# Patient Record
Sex: FEMALE | Race: WHITE | NOT HISPANIC OR LATINO | Employment: FULL TIME | URBAN - METROPOLITAN AREA
[De-identification: names, ages, dates, MRNs, and addresses within clinical notes are randomized per-mention and may not be internally consistent; named-entity substitution may affect disease eponyms.]

---

## 2022-03-22 ENCOUNTER — TRANSCRIBE ORDERS (OUTPATIENT)
Dept: PHYSICAL THERAPY | Facility: CLINIC | Age: 62
End: 2022-03-22

## 2022-03-22 DIAGNOSIS — M79.652 LEFT THIGH PAIN: ICD-10-CM

## 2022-03-22 DIAGNOSIS — M25.562 LEFT KNEE PAIN, UNSPECIFIED CHRONICITY: Primary | ICD-10-CM

## 2022-03-28 ENCOUNTER — TREATMENT (OUTPATIENT)
Dept: PHYSICAL THERAPY | Facility: CLINIC | Age: 62
End: 2022-03-28

## 2022-03-28 DIAGNOSIS — M25.562 ACUTE PAIN OF LEFT KNEE: Primary | ICD-10-CM

## 2022-03-28 PROCEDURE — 97112 NEUROMUSCULAR REEDUCATION: CPT | Performed by: PHYSICAL THERAPIST

## 2022-03-28 PROCEDURE — 97110 THERAPEUTIC EXERCISES: CPT | Performed by: PHYSICAL THERAPIST

## 2022-03-28 PROCEDURE — 97161 PT EVAL LOW COMPLEX 20 MIN: CPT | Performed by: PHYSICAL THERAPIST

## 2022-03-28 NOTE — PATIENT INSTRUCTIONS
Access Code: [email or web address]  URL: [email or web address]  Date: 03/28/2022  Prepared by: Deanne Sanchez    Exercises  Long Sitting Quad Set - 2-3 x daily - 15-30 reps  Supine Knee Extension Strengthening - 2-3 x daily - 15-30 reps  Small Range Straight Leg Raise - 2-3 x daily - 15-30 reps  Supine Heel Slide - 2-3 x daily - 15-30 reps  Standing Single Leg Stance with Counter Support - 2-3 x daily - 3 reps - 15-30 sec hold

## 2022-03-30 ENCOUNTER — TREATMENT (OUTPATIENT)
Dept: PHYSICAL THERAPY | Facility: CLINIC | Age: 62
End: 2022-03-30

## 2022-03-30 DIAGNOSIS — M25.562 ACUTE PAIN OF LEFT KNEE: Primary | ICD-10-CM

## 2022-03-30 PROCEDURE — 97530 THERAPEUTIC ACTIVITIES: CPT | Performed by: PHYSICAL THERAPIST

## 2022-03-30 PROCEDURE — 97112 NEUROMUSCULAR REEDUCATION: CPT | Performed by: PHYSICAL THERAPIST

## 2022-03-30 PROCEDURE — 97110 THERAPEUTIC EXERCISES: CPT | Performed by: PHYSICAL THERAPIST

## 2022-03-30 NOTE — PROGRESS NOTES
"Physical Therapy Daily Treatment Note      Patient: Trina Garcia   : 1960  Referring practitioner: Fabien Valentin MD  Date of Initial Visit: Type: THERAPY  Noted: 3/28/2022  Today's Date: 3/30/2022  Patient seen for 2 sessions       Visit Diagnoses:    ICD-10-CM ICD-9-CM   1. Acute pain of left knee  M25.562 719.46       Subjective   Trina Garcia reports: \"I'm doing much better. I think I was protecting it too much and not allowing it to move. I feel looser. I don't need the cane unless I'm out walking in the field. My knee is still locking some but it's less frequent.\"    Pre tx pn score: 0  Post tx pn score: 0      Objective   See Exercise, Manual, and Modality Logs for complete treatment.     Infrapatellar pn at end range knee flxn        Assessment & Plan     Assessment    Assessment details: Pt subjectively reports improvement in knee w/ HEP. Demonstrates improved knee flxn ROM, though cont to elicit infrapatellar pn at end range. She experienced locking episode when attempting to flex knee from extended position after performing calf raises-alleviated by further flexing knee. Stability w/ SLS improved, able to tolerate on airex well. No changes made to HEP.    Plan  Plan details: Progress per POC          Timed:         Manual Therapy:    0     mins  51447;     Therapeutic Exercise:    15     mins  09091;     Neuromuscular Liset:    8    mins  22032;    Therapeutic Activity:     9     mins  34623;     Gait Trainin     mins  69589;     Ultrasound:     0     mins  59260;    Ionto                               0    mins   13576  Self Care                       0     mins   43690  Canalith Repos    0     mins 39981      Un-Timed:  Electrical Stimulation:    0     mins  28408 ( );  Dry Needling     0     mins self-pay  Traction     0     mins 18931      Timed Treatment:   32   mins   Total Treatment:     32   mins    Deanne Sanchez, PT  [place] License: #[id number]                "

## 2022-04-01 ENCOUNTER — TREATMENT (OUTPATIENT)
Dept: PHYSICAL THERAPY | Facility: CLINIC | Age: 62
End: 2022-04-01

## 2022-04-01 DIAGNOSIS — M25.562 ACUTE PAIN OF LEFT KNEE: Primary | ICD-10-CM

## 2022-04-01 PROCEDURE — 97110 THERAPEUTIC EXERCISES: CPT | Performed by: PHYSICAL THERAPIST

## 2022-04-01 PROCEDURE — 97112 NEUROMUSCULAR REEDUCATION: CPT | Performed by: PHYSICAL THERAPIST

## 2022-04-01 NOTE — PROGRESS NOTES
Physical Therapy Daily Treatment Note      Patient: Trina Garcia   : 1960  Referring practitioner: Fabien Valentin MD  Date of Initial Visit: Type: THERAPY  Noted: 3/28/2022  Today's Date: 2022  Patient seen for 3 sessions       Visit Diagnoses:    ICD-10-CM ICD-9-CM   1. Acute pain of left knee  M25.562 719.46       Subjective   Trina Garcia reports: Misplaced her exercises so has had to work from memory. Hasn't used her cane the past couple of days. Still experiences locking if she stands w/ knee extended and then goes to bend it.    Pre tx pn score: 0  Post tx pn score: 0      Objective   See Exercise, Manual, and Modality Logs for complete treatment.     (-) popping/clicking w/ heel slides        Assessment & Plan     Assessment    Assessment details: Pt doing well in regards to pn. Less dependent on AD. Demo improved tolerance to active knee mobility. Cont to report locking sensation in knee when initiating flxn from fully extended position, primarily in standing. Emphasized quad control progressing to standing TKE w/ TB. Issued updated HEP, provided TB for home use. Pt out of the country for approx 1 month.     Plan  Plan details: Reassess upon return to PT after travel          Timed:         Manual Therapy:    0     mins  44007;     Therapeutic Exercise:    25     mins  69037;     Neuromuscular Liset:    10    mins  82310;    Therapeutic Activity:     0     mins  89694;     Gait Trainin     mins  16738;     Ultrasound:     0     mins  39968;    Ionto                               0    mins   36093  Self Care                       0     mins   18654  Canalith Repos    0     mins 50290      Un-Timed:  Electrical Stimulation:    0     mins  66117 ( );  Dry Needling     0     mins self-pay  Traction     0     mins 34819      Timed Treatment:   35   mins   Total Treatment:     35   mins    Deanne Sanchez, PT  [place] License: #[id number]

## 2022-05-31 ENCOUNTER — TRANSCRIBE ORDERS (OUTPATIENT)
Dept: PHYSICAL THERAPY | Facility: CLINIC | Age: 62
End: 2022-05-31

## 2022-06-01 ENCOUNTER — TREATMENT (OUTPATIENT)
Dept: PHYSICAL THERAPY | Facility: CLINIC | Age: 62
End: 2022-06-01

## 2022-06-01 DIAGNOSIS — M25.562 ACUTE PAIN OF LEFT KNEE: Primary | ICD-10-CM

## 2022-06-01 PROCEDURE — 97140 MANUAL THERAPY 1/> REGIONS: CPT | Performed by: PHYSICAL THERAPIST

## 2022-06-01 PROCEDURE — 97530 THERAPEUTIC ACTIVITIES: CPT | Performed by: PHYSICAL THERAPIST

## 2022-06-01 PROCEDURE — 97110 THERAPEUTIC EXERCISES: CPT | Performed by: PHYSICAL THERAPIST

## 2022-06-01 NOTE — PROGRESS NOTES
Physical Therapy Reassessment  Patient: Trina Garcia   : 1960  Diagnosis/ICD-10 Code:  Acute pain of left knee [M25.562]  Referring practitioner: Fabien Valentin MD  Date of Initial Visit: 2022  Today's Date: 2022  Patient seen for 4 sessions         Visit Diagnoses:    ICD-10-CM ICD-9-CM   1. Acute pain of left knee  M25.562 719.46       Subjective Questionnaire: LEFS:   Clinical Progress: improved  Home Program Compliance: Yes  Treatment has included: therapeutic exercise, neuromuscular re-education, manual therapy and therapeutic activity      Subjective   Trina Garcia reports: Returns after having travelled out of the country to visit her mother. Used WC service in the airport. Pn has been inconsistent. Has not had extreme pn. Still feels that her thigh/knee are swollen. Some days wakes and is so swollen she cannot walk but not precipitated by change in activity. Rarely has burning in medial knee. Most of her pn in inf/lateral to her knee cap. Also notes soreness along her lateral leg from her hip to her knee. Unable to fully extend knee-still feels as if it will lock.    Pre tx pn score: 0  Post tx pn score: 0      Objective          Tenderness   Left Knee   Tenderness in the MCL (distal).     Additional Tenderness Details  (-) joint line TTP    Active Range of Motion   Left Knee   Flexion: 133 degrees   Extension: 1 degrees     Additional Active Range of Motion Details  reports stiffness in knee    R knee AROM 2-0-133 deg    Patellar Mobility   Left Knee Patellar tendons within functional limits include the medial, lateral, superior and inferior.     Additional Patellar Mobility Details  No pn w/ patellar mobilization    Strength/Myotome Testing     Left Knee   Flexion: 4+  Extension: 5  Quadriceps contraction: good    Additional Strength Details  Mild inc ant knee pn w/ resisted knee flxn/extn    Tests     Left Knee   Positive lateral Annika.   Negative anterior drawer, medial Annika,  posterior drawer, valgus stress test at 0 degrees, valgus stress test at 30 degrees, varus stress test at 0 degrees and varus stress test at 30 degrees.     Ambulation     Observational Gait   Gait: antalgic   Decreased stride length.     Additional Observational Gait Details  Dec TKE at mid stance/terminal swing    Functional Assessment     Single Leg Stance   Left: 15 seconds  Right: 30 seconds      See Exercise, Manual, and Modality Logs for complete treatment.       Assessment & Plan     Assessment    Assessment details: Reassessment performed. Pt returns after approx 2 month absence while out of the country. Reports some improvement in pn from start of care but continues to complain of lateral knee discomfort, instability w/ ambulation and stair navigation and locking when attempting to flex knee from extended position. Has had MRI that per her report was (-) for meniscus tear but possibly showed MCL injury (perf at outside facility, record not available). Being referred for Ortho consult. Special testing grossly unremarkable. Mild discomfort reported along the lateral joint line w/ lateral Annika testing in absence of popping/clicking. She does show improvements in knee mobility, gait mechanics, and WBing tolerance since start of care. Pt needs continued PT to restore full strength, ROM, and function in order to allow return to full work and daily activities w/o pn or dysfunction. Issued updated HEP as outlined in flow sheet.      Goals  Plan Goals: STG 3 wks  1) Pt to be compliant w/ initial HEP for ROM, strength and symptom mgmt.-MET  2) Pt to report decreased episodes of instability w/ ambulation.-ONGOING  3) Pt to improve L knee ROM to 132 deg flxn.-MET  4) Pt to improve L hamstring strength to 5/5 w/o inc pn.-ONGOING  5) Pt to improve LEFS score to 46/80 or better to reflect improved pn and function-ONGOING.    LTG 6 wks  1) Pt to be independent w/ long term HEP and self mgmt.-ONGOING  2) Pt to report  ability to ambulate independently over various terrains w/o pn or perceived instability.-ONGOING  3) Pt to improve LEFS score to 52/80 or better to reflect improved pn and function.-ONGOING      Plan  Plan details: Cont w/ focus on restoration of full and pn free TKE, hip/knee strengthening and stabilization        Progress toward previous goals: Partially Met        Timed:         Manual Therapy:    8     mins  95331;     Therapeutic Exercise:    12     mins  15794;     Neuromuscular Liset:    0    mins  48924;    Therapeutic Activity:     25     mins  09723;     Gait Trainin     mins  63650;     Ultrasound:     0     mins  35911;    Ionto                               0    mins   59332  Self Care                       0     mins   48712  Canalith Repos    0     mins 61217      Un-Timed:  Electrical Stimulation:    0     mins  16487 ( );  Dry Needling     0     mins self-pay  Traction     0     mins 19125  Re-Eval                           0    mins  42109      Timed Treatment:   45   mins   Total Treatment:     45   mins          PT: Deanne Sanchez PT     [place] License: #[id number]    Electronically signed by Deanne Sanchez PT, 22, 2:00 PM EDT

## 2022-06-01 NOTE — PATIENT INSTRUCTIONS
Access Code: [email or web address]  URL: [email or web address]  Date: 06/01/2022  Prepared by: Deanne Sanchez    Exercises  Small Range Straight Leg Raise - 1-2 x daily - 15-30 reps  Supine Heel Slide - 1-2 x daily - 15-30 reps  Standing Single Leg Stance with Counter Support - 1-2 x daily - 3 reps - 15-30 sec hold  Step Up - 1-2 x daily - 15-30 reps  Gastroc Stretch on Wall - 1-2 x daily - 3 reps - 20-30 sec hold  Soleus Stretch on Wall - 1-2 x daily - 3 reps - 20-30 sec hold  ITB Stretch at Wall - 1-2 x daily - 3 reps - 20-30 sec hold

## 2022-06-08 ENCOUNTER — TREATMENT (OUTPATIENT)
Dept: PHYSICAL THERAPY | Facility: CLINIC | Age: 62
End: 2022-06-08

## 2022-06-08 DIAGNOSIS — M25.562 ACUTE PAIN OF LEFT KNEE: Primary | ICD-10-CM

## 2022-06-08 PROCEDURE — 97112 NEUROMUSCULAR REEDUCATION: CPT | Performed by: PHYSICAL THERAPIST

## 2022-06-08 PROCEDURE — 97110 THERAPEUTIC EXERCISES: CPT | Performed by: PHYSICAL THERAPIST

## 2022-06-08 NOTE — PROGRESS NOTES
Physical Therapy Daily Treatment Note      Patient: Trina Garcia   : 1960  Referring practitioner: Fabien Valentin MD  Date of Initial Visit: Type: THERAPY  Noted: 3/28/2022  Today's Date: 2022  Patient seen for 5 sessions       Visit Diagnoses:    ICD-10-CM ICD-9-CM   1. Acute pain of left knee  M25.562 719.46       Subjective   Trina Garcia reports: Knee pn and swelling seem to be worse. Has been more diligent with her exercises but seems to hurt more the more she does them-primarily w/ heel slides and standing ITB stretch. Having burning pn in the medial knee after using her electric massager over her knee. Knee gives way when she takes her first steps after sitting for any period of time. Cannot comfortably straighten her knee out. Received auth for Ortho consult, waiting for scheduling    Pre tx pn score: 6  Post tx pn score: 3      Objective   See Exercise, Manual, and Modality Logs for complete treatment.     Dec TKE    Assessment & Plan     Assessment    Assessment details: Pt c/o increased medial and lateral knee pn over the past week. Lateral pn>medial. Contributes at least in part to her exercises. Reported inc ant knee pn w/ supine heel slides and experienced several locking episodes when perform in clinic today. Discussed replacing supine heel slides w/ prone knee flxn given improved tolerance with the latter. Also modified standing ITB to sidelying. Incorporated active tibial IR/ER mobility in attempt to restore normal tibifemoral arthrokinematics. She reported reduction in lateral knee pn at end of visit. Encouraged ice/elevation to manage inc edema as well as considering return to use of knee brace. Also counseled against using electric massage directly over joint line and focusing instead over soft tissue, such as proximal calf, lateral quad to reduce muscle tension. She verbalized understanding.    Plan  Plan details: Assess response to modified HEP          Timed:         Manual  Therapy:    0     mins  22443;     Therapeutic Exercise:    23     mins  31557;     Neuromuscular Liset:    15    mins  52379;    Therapeutic Activity:     0     mins  26598;     Gait Trainin     mins  68361;     Ultrasound:     0     mins  25214;    Ionto                               0    mins   07164  Self Care                       0     mins   56081  Canalith Repos    0     mins 65766      Un-Timed:  Electrical Stimulation:    0     mins  28418 ( );  Dry Needling     0     mins self-pay  Traction     0     mins 37237      Timed Treatment:   38   mins   Total Treatment:     38   mins    Deanne Sanchez, PT  [place] License: #[id number]

## 2022-06-08 NOTE — PATIENT INSTRUCTIONS
Access Code: [email or web address]  URL: [email or web address]  Date: 06/08/2022  Prepared by: Deanne Sanchez    Exercises  Prone Knee Flexion AROM - 1-2 x daily - 15-30 reps  Sidelying ITB Stretch off Table - 1-2 x daily - 3 reps - 20-30 sec hold  Standing Single Leg Stance with Counter Support - 1-2 x daily - 3 reps - 15-30 sec hold  Step Up - 1-2 x daily - 15-30 reps  Gastroc Stretch on Wall - 1-2 x daily - 3 reps - 20-30 sec hold  Soleus Stretch on Wall - 1-2 x daily - 3 reps - 20-30 sec hold

## 2022-06-15 ENCOUNTER — TREATMENT (OUTPATIENT)
Dept: PHYSICAL THERAPY | Facility: CLINIC | Age: 62
End: 2022-06-15

## 2022-06-15 DIAGNOSIS — M25.562 ACUTE PAIN OF LEFT KNEE: Primary | ICD-10-CM

## 2022-06-15 PROCEDURE — 97110 THERAPEUTIC EXERCISES: CPT | Performed by: PHYSICAL THERAPIST

## 2022-06-15 PROCEDURE — 97112 NEUROMUSCULAR REEDUCATION: CPT | Performed by: PHYSICAL THERAPIST

## 2022-06-15 NOTE — PROGRESS NOTES
Physical Therapy Daily Treatment Note      Patient: Trina Garcia   : 1960  Referring practitioner: Fabien Valentin MD  Date of Initial Visit: Type: THERAPY  Noted: 3/28/2022  Today's Date: 6/15/2022  Patient seen for 6 sessions       Visit Diagnoses:    ICD-10-CM ICD-9-CM   1. Acute pain of left knee  M25.562 719.46       Subjective   Trina Garcia reports: Feeling better compared to her last visit. Has been using ice more frequently which has helped w/ pn. Feels like her shin is swollen.  Has the most discomfort w/ mediolateral movements of the knee.     Pre tx pn score: 5-6  Post tx pn score: 5-6      Objective   See Exercise, Manual, and Modality Logs for complete treatment.       Assessment & Plan     Assessment    Assessment details: Pt reports some improvement in pn relative to previous visit, but continues to have intermittent flare ups without known precipitating factors. Continues to experience instability w/ CKC activity, locking episodes when attempting to flex knee from extended position. Still lacking full TKE. Has consult w/ Ortho later this week. May benefit from continued PT to restore pn free knee ROM, hip/knee strength, and allow return to PLOF.    Plan  Plan details: Cont per Ortho recommendation          Timed:         Manual Therapy:    0     mins  13553;     Therapeutic Exercise:    30     mins  70477;     Neuromuscular Liset:    8    mins  56553;    Therapeutic Activity:     0     mins  26583;     Gait Trainin     mins  51290;     Ultrasound:     0     mins  52431;    Ionto                               0    mins   68920  Self Care                       0     mins   04432  Canalith Repos    0     mins 30423      Un-Timed:  Electrical Stimulation:    0     mins  24251 ( );  Dry Needling     0     mins self-pay  Traction     0     mins 46767      Timed Treatment:   38   mins   Total Treatment:     38   mins    Deanne Sanchez, PT  [place] License: #[id number]

## 2022-06-17 ENCOUNTER — OFFICE VISIT (OUTPATIENT)
Dept: ORTHOPEDIC SURGERY | Facility: CLINIC | Age: 62
End: 2022-06-17

## 2022-06-17 ENCOUNTER — PATIENT ROUNDING (BHMG ONLY) (OUTPATIENT)
Dept: ORTHOPEDIC SURGERY | Facility: CLINIC | Age: 62
End: 2022-06-17

## 2022-06-17 VITALS
WEIGHT: 169.2 LBS | HEIGHT: 64 IN | SYSTOLIC BLOOD PRESSURE: 148 MMHG | DIASTOLIC BLOOD PRESSURE: 96 MMHG | BODY MASS INDEX: 28.89 KG/M2

## 2022-06-17 DIAGNOSIS — S83.412A SPRAIN OF MEDIAL COLLATERAL LIGAMENT OF LEFT KNEE, INITIAL ENCOUNTER: ICD-10-CM

## 2022-06-17 DIAGNOSIS — Z02.6 ENCOUNTER RELATED TO WORKER'S COMPENSATION CLAIM: ICD-10-CM

## 2022-06-17 DIAGNOSIS — M25.562 LEFT KNEE PAIN, UNSPECIFIED CHRONICITY: Primary | ICD-10-CM

## 2022-06-17 PROCEDURE — 99203 OFFICE O/P NEW LOW 30 MIN: CPT | Performed by: ORTHOPAEDIC SURGERY

## 2022-06-17 RX ORDER — AMLODIPINE BESYLATE 5 MG/1
1 TABLET ORAL DAILY
COMMUNITY
Start: 2022-03-21

## 2022-06-17 RX ORDER — HYDROCHLOROTHIAZIDE 12.5 MG/1
1 TABLET ORAL AS NEEDED
COMMUNITY
Start: 2022-03-21

## 2022-06-17 NOTE — PROGRESS NOTES
"      [facility] [place], [place] Orthopaedic Surgery Clinic Note    Subjective     CC: Pain of the Left Knee      HPI    Trina Garcia is a 61 y.o. female who presents with new problem of: left knee pain.  Onset: after taking a wrong step. The issue has been ongoing for 3 month(s). Pain is a 3-4/10 on the pain scale. Pain is described as dull, aching, burning, throbbing and stabbing. Associated symptoms include pain, swelling, popping, stiffness and giving way/buckling. The pain is worse with walking, standing, sitting, climbing stairs and rising from seated position; resting, sitting, ice, heat and elevating the extremity improve the pain. Previous treatments have included: bracing, NSAIDS and physical therapy.    I have reviewed the following portions of the patient's history:History of Present Illness and review of systems.    She had a twisting injury at work March 21.  She had 6 sessions of physical therapy on [address]. she is a  and working full duty.    Review of Systems   Constitutional: Negative.    HENT: Negative.    Eyes: Negative.    Respiratory: Negative.    Cardiovascular: Negative.    Gastrointestinal: Negative.    Endocrine: Negative.    Genitourinary: Negative.    Musculoskeletal: Positive for arthralgias.   Skin: Negative.    Allergic/Immunologic: Negative.    Neurological: Negative.    Hematological: Negative.    Psychiatric/Behavioral: Negative.        ROS:    Constiutional:Pt denies fever, chills, nausea, or vomiting.  MSK:as above      Objective      Past Medical History  Past Medical History:   Diagnosis Date   • High blood pressure    • Knee sprain    • Tear of meniscus of knee     suspect, yet MRI did not indicate   • Tendinitis of knee     as per MRI?         Physical Exam  /96   Ht 161.3 cm (63.5\")   Wt 76.7 kg (169 lb 3.2 oz)   BMI 29.50 kg/m²     Body mass index is 29.5 kg/m².    Patient is well nourished and well developed.        Ortho Exam  Left knee is tender medial joint line.  Pain " with valgus stress.  1+ effusion.    Imaging/Labs/EMG Reviewed:  Imaging Results (Last 24 Hours)     Procedure Component Value Units Date/Time    XR Knee 4+ View Left [[id number]] Resulted: 06/17/22 0845     Updated: 06/17/22 0845    Narrative:      Knee X-Ray  Indication: Pain    Upright AP of bilateral knees. Lateral, skiers and Sunrise views of left   knee     Findings:  No fracture  No bony lesion  Normal soft tissues  Normal joint spaces    No prior studies were available for comparison.        I viewed and personally interpreted her left knee MRI from May 27 as medial collateral ligament sprain pre-existing arthritis and osteochondral defect    Assessment:  1. Left knee pain, unspecified chronicity    2. Sprain of medial collateral ligament of left knee, initial encounter    3. Encounter related to worker's compensation claim        Plan:  Recommend over the counter anti-inflammatories for pain and/or swelling  She will go to [facility] for physical therapy.  I ordered a hinged knee brace for stability.  She is working her regular job full duty.  Follow-up in 1 month    Follow Up:   Return in about 1 month (around 7/17/2022).      Medical Decision Making  Management Options : Low - OTC Drugs and OT or PT Therapy         Orville Story M.D., [facility]  Orthopedic Surgeon  Fellowship Trained Sports Medicine  [facility]  Orthopedics and Sports Medicine  [address] [place] [address], [address]  [place] [address]    EMR Dragon/Transcription disclaimer:  Much of this encounter note is an electronic transcription of spoken language to printed text. Electronic transcription of spoken language may permit erroneous, or at times, nonsensical words or phrases to be inadvertently transcribed. Although I have reviewed the note for such errors, some may still exist.

## 2022-06-17 NOTE — PROGRESS NOTES
June 17, 2022    Hello, may I speak with Trina Garcia?    My name is Cheryl      I am  with [facility]  [address]  [facility] [address].    Before we get started may I verify your date of birth? 1960    I am calling to officially welcome you to our practice and ask about your recent visit. Is this a good time to talk? No.  Voice message left.         Thank you, and have a great day.

## 2022-06-27 ENCOUNTER — TELEPHONE (OUTPATIENT)
Dept: ORTHOPEDIC SURGERY | Facility: CLINIC | Age: 62
End: 2022-06-27

## 2022-06-27 NOTE — TELEPHONE ENCOUNTER
----- Message from Trina Garcia sent at 6/26/2022  7:26 PM EDT -----  Regarding: Left Knee injury  It was brought to my attention that my lifestyle might not have been clearly expressed.  Since this is a workman's comp claim, we have only discussed my work lifestyle at the [employer], which is as a part-time .  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  I have been wearing the hinged brace and walking slowly, carefully on the uneven surface.  With this updated info, I need to know if this is ok or if this movement is too much for the knee and if so, what can we do I need to do differently without totally disrupting my normal life.

## 2022-06-29 ENCOUNTER — TREATMENT (OUTPATIENT)
Dept: PHYSICAL THERAPY | Facility: CLINIC | Age: 62
End: 2022-06-29

## 2022-06-29 DIAGNOSIS — M25.562 ACUTE PAIN OF LEFT KNEE: Primary | ICD-10-CM

## 2022-06-29 PROCEDURE — 97530 THERAPEUTIC ACTIVITIES: CPT | Performed by: PHYSICAL THERAPIST

## 2022-06-29 PROCEDURE — 97110 THERAPEUTIC EXERCISES: CPT | Performed by: PHYSICAL THERAPIST

## 2022-06-29 PROCEDURE — 97140 MANUAL THERAPY 1/> REGIONS: CPT | Performed by: PHYSICAL THERAPIST

## 2022-06-29 NOTE — PROGRESS NOTES
Physical Therapy Re Certification Of Plan of Care  Patient: Trina Garcia   : 1960  Diagnosis/ICD-10 Code:  Acute pain of left knee [M25.562]  Referring practitioner: Orville Story MD  Date of Initial Visit: 2022  Today's Date: 2022  Patient seen for 7 sessions         Visit Diagnoses:    ICD-10-CM ICD-9-CM   1. Acute pain of left knee  M25.562 719.46       Subjective Questionnaire: LEFS: 15/80  Clinical Progress: unchanged  Home Program Compliance: Yes  Treatment has included: therapeutic exercise, neuromuscular re-education, manual therapy and therapeutic activity      Subjective   Trina Garcia reports: Denies improvement in knee pn. Has had to crawl up her stairs to go to bed. Must have some kind of support when walking-cane, counter tops, etc. Cannot tolerate laying on side w/ knees together, even w/ pillow between knees-medial knee burns. Most of her pn localized to medial knee and anterior joint line. Swelling in lateral knee/quad seems improved. Saw Ortho-given hinged knee brace, continued PT recommended.    Pre tx pn score: 6  Post tx pn score: 5    Objective          Tenderness   Left Knee   Tenderness in the MCL (distal).     Active Range of Motion   Left Knee   Flexion: 120 degrees with pain  Extension: 0 degrees     Right Knee   Flexion: 133 degrees   Extension: 0 degrees     Additional Active Range of Motion Details  Palpable clicking lateral joint line w/ active knee mobility    Patellar Mobility   Left Knee Patellar tendons within functional limits include the medial, lateral, superior and inferior.     Additional Patellar Mobility Details  No pn w/ patellar mobilization    Strength/Myotome Testing     Left Knee   Flexion: 4+  Extension: 4+  Quadriceps contraction: good    Additional Strength Details  Mild inc ant/medial knee pn w/ resisted knee extn      Tests     Left Knee   Positive Thessaly's test at 5 degrees and Thessaly's test at 20 degrees.   Negative anterior drawer,  anterior Lachman, lateral Annika, medial Annika, posterior drawer, valgus stress test at 0 degrees, valgus stress test at 30 degrees, varus stress test at 0 degrees and varus stress test at 30 degrees.     Ambulation     Observational Gait     Additional Observational Gait Details  Ambulate w/ cane-Demo dec walking speed, dec stance time on LLE, maintains knee flxn throughout gait cycle          Assessment & Plan     Assessment  Impairments: abnormal gait, abnormal or restricted ROM, activity intolerance, impaired balance, impaired physical strength, pain with function and weight-bearing intolerance  Functional Limitations: carrying objects, lifting, sleeping, walking, uncomfortable because of pain, moving in bed, sitting, standing and stooping  Assessment details: 90 day recert performed. Pt continues to complain of medial/anterior knee pn, instability w/ ambulation and stair navigation, and locking when attempting to flex knee from extended position. She is dependent on a SPC for mobility and demonstrates significantly altered gait mechanics. Quad/hamstring strength functional but painful w/ testing. Pt stable to ligamentous testing but reports inc medial knee pn w/ valgus stress test. She has minimal tolerance to SLS. Pt needs continued PT to restore full strength, ROM, and function in order to allow return to full work and daily activities w/o pn or dysfunction.    Prognosis: fair    Goals  Plan Goals: STG 3 wks  1) Pt to be compliant w/ initial HEP for ROM, strength and symptom mgmt.-MET  2) Pt to report decreased episodes of instability w/ ambulation.-ONGOING  3) Pt to improve L knee ROM to 132 deg flxn.-ONGOING  4) Pt to improve L hamstring strength to 5/5 w/o inc pn.-ONGOING  5) Pt to improve LEFS score to 46/80 or better to reflect improved pn and function-ONGOING.    LTG 6 wks  1) Pt to be independent w/ long term HEP and self mgmt.-ONGOING  2) Pt to report ability to ambulate independently over  various terrains w/o pn or perceived instability.-ONGOING  3) Pt to improve LEFS score to 52/80 or better to reflect improved pn and function.-ONGOING    Plan  Therapy options: will be seen for skilled therapy services  Planned modality interventions: cryotherapy, dry needling, TENS and thermotherapy (hydrocollator packs)  Planned therapy interventions: ADL retraining, balance/weight-bearing training, body mechanics training, flexibility, gait training, home exercise program, IADL retraining, manual therapy, neuromuscular re-education, strengthening, therapeutic activities and transfer training  Frequency: 2x week  Duration in weeks: 12  Plan details: TE/TA/NMR/MT/GT to address noted deficits, modalities as indicated for pn control           Recommendations: Continue as planned  Timeframe: 3 months  Prognosis to achieve goals: fair      Timed:         Manual Therapy:    12     mins  29205;     Therapeutic Exercise:    10     mins  72864;     Neuromuscular Liset:    0    mins  72753;    Therapeutic Activity:     25     mins  73665;     Gait Trainin     mins  64101;     Ultrasound:     0     mins  59238;    Ionto                               0    mins   54182  Self Care                       0     mins   61334  Canalith Repos    0     mins 72580      Un-Timed:  Electrical Stimulation:    0     mins  27038 ( );  Dry Needling     0     mins self-pay  Traction     0     mins 54825  Re-Eval                           0    mins  24699      Timed Treatment:   47   mins   Total Treatment:     47   mins        PT: Deanne Sanchez PT     [place] License:  [id number]    Electronically signed by Deanne Sanchez PT, 22, 8:42 AM EDT    Certification Period: 2022 thru 2022  I certify that the therapy services are furnished while this patient is under my care.  The services outlined above are required by this patient, and will be reviewed every 90 days.         Physician  Signature:__________________________________________________    PHYSICIAN: Orville Story MD  NPI: [id number]                                      DATE:  :     Please sign and return via fax to .[phone] . Thank you, [facility]

## 2022-06-29 NOTE — PATIENT INSTRUCTIONS
Access Code: [email or web address]  URL: [email or web address]  Date: 06/29/2022  Prepared by: Deanne Sanchez    Exercises  Sidelying ITB Stretch off Table - 1-2 x daily - 3 reps - 20-30 sec hold  Gastroc Stretch on Wall - 1-2 x daily - 3 reps - 20-30 sec hold  Soleus Stretch on Wall - 1-2 x daily - 3 reps - 20-30 sec hold  Partial Lunge Matrix with Chair - 1-2 x daily - 15-30 reps  Supine Bridge - 1-2 x daily - 15-30 reps  Seated Hamstring Set - 2-3 x daily - 15-30 reps

## 2022-07-13 ENCOUNTER — TREATMENT (OUTPATIENT)
Dept: PHYSICAL THERAPY | Facility: CLINIC | Age: 62
End: 2022-07-13

## 2022-07-13 DIAGNOSIS — M25.562 ACUTE PAIN OF LEFT KNEE: Primary | ICD-10-CM

## 2022-07-13 PROCEDURE — 97112 NEUROMUSCULAR REEDUCATION: CPT | Performed by: PHYSICAL THERAPIST

## 2022-07-13 PROCEDURE — 97110 THERAPEUTIC EXERCISES: CPT | Performed by: PHYSICAL THERAPIST

## 2022-07-13 NOTE — PROGRESS NOTES
Physical Therapy Daily Treatment Note      Patient: Trina Garcia   : 1960  Referring practitioner: Orville Story MD  Date of Initial Visit: Type: THERAPY  Noted: 3/28/2022  Today's Date: 2022  Patient seen for 8 sessions       Visit Diagnoses:    ICD-10-CM ICD-9-CM   1. Acute pain of left knee  M25.562 719.46       Subjective   Trian Garcia reports: Had a family emergency last week and had to cancel her visit. Has done fairly well over the past 5-6 days. Has carried the cane but hasn't had to use it. Had to discontinue some of her exercises due to onset of inc knee pn. She tolerates her stretches w/o issue. Has noticed if walking more than 2,000 steps/day develops pn in the lateral knee and sometimes up to her L hip. Tolerates up to 2,000 fairly well.    Pre tx pn score: 3  Post tx pn score: 3      Objective   See Exercise, Manual, and Modality Logs for complete treatment.     Ambulates throughout clinic independently, lacking TKE, maintains knee in slight flxn throughout gait cycle. Gait mildly antalgic.      Assessment & Plan     Assessment    Assessment details: Pt reports worsening of anterior and medial knee pn w/ previously issued exercises, primarily those involving active movement of the knee. Tolerates stretches w/o complaint. Modified program and transitioned to focus on hip abd/ER activation in attempt to improve dynamic control of the knee through the hip and offload the medial knee compartment. She tolerated most w/o complaint, reported only mild medial knee discomfort w/ resisted sidesteps when walking toward her R, so discussed walking toward L only. Issued updated HEP as outlined in chart. Pt returns to Ortho for f/u next week.    Plan  Plan details: Cont per MD recommendations          Timed:         Manual Therapy:    0     mins  29835;     Therapeutic Exercise:    24     mins  55172;     Neuromuscular Liset:    10    mins  64704;    Therapeutic Activity:     0     mins  42208;      Gait Trainin     mins  40104;     Ultrasound:     0     mins  80438;    Ionto                               0    mins   89013  Self Care                       0     mins   88594  Canalith Repos    0     mins 36317      Un-Timed:  Electrical Stimulation:    0     mins  97006 ( );  Dry Needling     0     mins self-pay  Traction     0     mins 66580      Timed Treatment:   34   mins   Total Treatment:     34   mins    Deanne Sanchez, PT  [place] License: #[id number]

## 2022-07-13 NOTE — PATIENT INSTRUCTIONS
Access Code: [email or web address]  URL: [email or web address]  Date: 07/13/2022  Prepared by: Deanne Sanchez    Exercises  Sidelying ITB Stretch off Table - 1-2 x daily - 3 reps - 20-30 sec hold  Gastroc Stretch on Wall - 1-2 x daily - 3 reps - 20-30 sec hold  Soleus Stretch on Wall - 1-2 x daily - 3 reps - 20-30 sec hold  Side Stepping with Resistance at Thighs - 1-2 x daily - 15-30 reps  Band Walks - 1-2 x daily - 15-30 reps  Standing Hip Extension with Resistance at Ankles and Counter Support - 1-2 x daily - 15-30 reps  Standing Hip Abduction with Resistance at Ankles and Counter Support - 1-2 x daily - 15-30 reps

## 2022-07-14 ENCOUNTER — TELEPHONE (OUTPATIENT)
Dept: ORTHOPEDIC SURGERY | Facility: CLINIC | Age: 62
End: 2022-07-14

## 2022-07-14 NOTE — TELEPHONE ENCOUNTER
Called patient 7/14 to reschedule this appointment 7/18 per provider, LVM to reschedule out past 7/27 if patient calls

## 2022-07-20 ENCOUNTER — TREATMENT (OUTPATIENT)
Dept: PHYSICAL THERAPY | Facility: CLINIC | Age: 62
End: 2022-07-20

## 2022-07-20 DIAGNOSIS — M25.562 ACUTE PAIN OF LEFT KNEE: Primary | ICD-10-CM

## 2022-07-20 PROCEDURE — 97110 THERAPEUTIC EXERCISES: CPT | Performed by: PHYSICAL THERAPIST

## 2022-07-20 PROCEDURE — 97112 NEUROMUSCULAR REEDUCATION: CPT | Performed by: PHYSICAL THERAPIST

## 2022-07-20 NOTE — PROGRESS NOTES
Physical Therapy Daily Treatment Note      Patient: Trina Garcia   : 1960  Referring practitioner: Orville Story MD  Date of Initial Visit: Type: THERAPY  Noted: 3/28/2022  Today's Date: 2022  Patient seen for 9 sessions       Visit Diagnoses:    ICD-10-CM ICD-9-CM   1. Acute pain of left knee  M25.562 719.46       Subjective   Trina Garcia reports: Only uses brace w/ prolonged walking. Almost seems to be more irritated w/ brace on. Up to walking closer to 2500 steps before onset of pn. Develops medial joint line pn w/ extended walking. Still cannot tolerate direct pressure against the inner side of the knee when sleeping at night.    Pre tx pn score: 3  Post tx pn score: 3      Objective   See Exercise, Manual, and Modality Logs for complete treatment.       Assessment & Plan     Assessment    Assessment details: Pn decreased per pt report. Gait mechanics appear to be improved. Pt not dependent upon AD but continues to have difficulty achieving TKE actively or throughout gait cycle. Continued w/ focus on OKC/CKC hip strengthening in attempt to facilitate improved dynamic hip/knee control and offload medial knee. Pt reported intermittent popping/catching in knee, but overall pn unchanged by end of visit. No changes made to HEP. Ortho appt rescheduled to .    Plan  Plan details: Cont w/ hip/knee strengthening, knee mobility exercises as tolerated-may attempt step up          Timed:         Manual Therapy:    0     mins  65786;     Therapeutic Exercise:    30     mins  15380;     Neuromuscular Liset:    12    mins  03078;    Therapeutic Activity:     0     mins  51937;     Gait Trainin     mins  53439;     Ultrasound:     0     mins  60718;    Ionto                               0    mins   77769  Self Care                       0     mins   47886  Canalith Repos    0     mins 75393      Un-Timed:  Electrical Stimulation:    0     mins  71021 (MC );  Dry Needling     0     mins  self-pay  Traction     0     mins 29467      Timed Treatment:   42   mins   Total Treatment:     42   mins    Deanne Sanchez, PT  [place] License: #[id number]

## 2022-08-22 ENCOUNTER — OFFICE VISIT (OUTPATIENT)
Dept: ORTHOPEDIC SURGERY | Facility: CLINIC | Age: 62
End: 2022-08-22

## 2022-08-22 VITALS
DIASTOLIC BLOOD PRESSURE: 80 MMHG | SYSTOLIC BLOOD PRESSURE: 132 MMHG | BODY MASS INDEX: 28.51 KG/M2 | HEIGHT: 64 IN | WEIGHT: 167 LBS

## 2022-08-22 DIAGNOSIS — S83.412D SPRAIN OF MEDIAL COLLATERAL LIGAMENT OF LEFT KNEE, SUBSEQUENT ENCOUNTER: Primary | ICD-10-CM

## 2022-08-22 DIAGNOSIS — Z02.6 ENCOUNTER RELATED TO WORKER'S COMPENSATION CLAIM: ICD-10-CM

## 2022-08-22 PROCEDURE — 99213 OFFICE O/P EST LOW 20 MIN: CPT | Performed by: PHYSICIAN ASSISTANT

## 2022-08-22 RX ORDER — LOSARTAN POTASSIUM 50 MG/1
TABLET ORAL
COMMUNITY
Start: 2022-07-29

## 2022-08-22 NOTE — PROGRESS NOTES
"    [facility] [place] [facility] Note        Subjective     CC: Follow-up (2 month f/u Left knee pain; worker's comp claim)      HPI    Trina Garcia is a 61 y.o. female.  Patient returns today for follow-up left knee pain, MCL sprain.  She has been undergoing formal PT which is helping.  She is wearing a hinged knee brace which is on the bigger side now since she has noted decreased swelling to the knee.    Pain scale: 2/10.  She describes the pain as dull, aching and burning.  The burning is to the medial aspect of the knee that dull, aching to the rest of the knee.  Swelling, popping, grinding and stiffness are also noted.  Walking, standing, stair climbing and sleeping do cause increased pain.  Initially when she was walking over 2000 steps she would have increased pain now except to 3500 steps.  With physical therapy she has been working predominantly on the stretching and range of motion as this does help her symptoms.  She noted that the exercises aggravated her symptoms.  Intermittent use of ibuprofen.    Overall, patient's symptoms are slowly improving.    ROS:    Constiutional:Pt denies fever, chills, nausea, or vomiting.  MSK:as above        Objective      Past Medical History  Past Medical History:   Diagnosis Date   • High blood pressure    • Knee sprain    • Knee swelling    • Tear of meniscus of knee     suspect, yet MRI did not indicate   • Tendinitis of knee     as per MRI?         Physical Exam  /80   Ht 161.3 cm (63.5\")   Wt 75.8 kg (167 lb)   BMI 29.12 kg/m²     Body mass index is 29.12 kg/m².    Patient is well nourished and well developed.        Ortho Exam  Left knee  Skin: Intact without any erythema, warmth.  Still with trace swelling/effusion.  Motion: 0-120° with crepitus.  Tenderness: Predominantly tender along MCL and medial joint line.    Instability: Lachman negative. Varus and valgus stress test negative for laxity but still some mild discomfort with valgus stress.  Straight " leg raise: Intact.  Motor: Grossly intact Q/HS/TA/GS/EHL/P  Sensory: Grossly intact DP/SP/S/S/T nerve distributions.       Imaging/Labs/EMG Reviewed:  No new imaging today.      Assessment:  1. Sprain of medial collateral ligament of left knee, subsequent encounter    2. Encounter related to worker's compensation claim        Plan:  Sprain MCL, stable.  Work-related injury.  Continue working with formal PT.  Let them know which exercises aggravate her knee so they can be able to adjust her rehab accordingly.  Needs to continue working on quadriceps/VMO strengthening.  Provided better fitting hinged knee brace for stability and support.  Recommend ibuprofen on a more scheduled basis to help with inflammation/pain control.  Follow-up with Dr. Story in 4 weeks for repeat evaluation.  Questions and concerns answered.      Mihaela Conteh PA-C  08/22/22  11:38 EDT      Dictated Utilizing Dragon Dictation.

## 2022-08-26 ENCOUNTER — OFFICE VISIT (OUTPATIENT)
Dept: ORTHOPEDIC SURGERY | Facility: CLINIC | Age: 62
End: 2022-08-26

## 2022-08-26 VITALS
WEIGHT: 167 LBS | DIASTOLIC BLOOD PRESSURE: 82 MMHG | HEIGHT: 64 IN | BODY MASS INDEX: 28.51 KG/M2 | SYSTOLIC BLOOD PRESSURE: 124 MMHG

## 2022-08-26 DIAGNOSIS — S83.412D SPRAIN OF MEDIAL COLLATERAL LIGAMENT OF LEFT KNEE, SUBSEQUENT ENCOUNTER: ICD-10-CM

## 2022-08-26 DIAGNOSIS — M25.562 LEFT KNEE PAIN, UNSPECIFIED CHRONICITY: Primary | ICD-10-CM
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
## 2022-08-26 DIAGNOSIS — S89.92XA KNEE INJURY, LEFT, INITIAL ENCOUNTER: ICD-10-CM

## 2022-08-26 PROCEDURE — 99214 OFFICE O/P EST MOD 30 MIN: CPT | Performed by: PHYSICIAN ASSISTANT

## 2022-08-26 RX ORDER — IBUPROFEN 200 MG
200 TABLET ORAL EVERY 6 HOURS PRN
COMMUNITY

## 2022-08-26 NOTE — PROGRESS NOTES
"    [facility] [place] [facility] Note        Subjective     CC: Follow-up (Left Knee Pain Flare after Fall x 2 days)  DOI: 8/24/2022    HPI    Trina Garcia is a 61 y.o. female.  Patient presents to the clinic for evaluation of left knee pain.  She has had a history of MCL sprain and has been undergoing formal PT (work-related injury).  I last saw her 8/22/2022, at that time she was walking over 2000 steps and has been gradually increasing to 3500 steps.  Working with physical therapy was helping with her symptoms.  She was to continue to work with PT working on quadricep and VMO strengthening.    Unfortunately on 8/24/2022, patient fell secondary to knee giving away/buckling.  This fall resulted in a valgus stress to the knee.  Since then she has been having increased pain, swelling and tightness to the knee.  She presents today in a wheelchair.    Current pain scale 9/10.  Associated symptoms swelling, popping, grinding, stiffness and giving way.  Pain is worse with walking, standing, stair climbing, sleeping, lying on the affected side, rising from a seated position, movement of joint.  Resting, ice, medication, lying down and elevating has been helping the knee prior to her fall 2 days ago.  No reported numbness or tingling into the extremity.    Overall, patient's symptoms are worsening.    ROS:    Constiutional:Pt denies fever, chills, nausea, or vomiting.  MSK:as above        Objective      Past Medical History  Past Medical History:   Diagnosis Date   • High blood pressure    • Knee sprain    • Knee swelling    • Tear of meniscus of knee     suspect, yet MRI did not indicate   • Tendinitis of knee     as per MRI?         Physical Exam  /82   Ht 161.3 cm (63.5\")   Wt 75.8 kg (167 lb)   BMI 29.12 kg/m²     Body mass index is 29.12 kg/m².    Patient is well nourished and well developed.        Ortho Exam  Left knee  Skin: Intact without any erythema, warmth.    Positive effusion.   Motion: 0-90° with " crepitus.  Tenderness: Continues to be tender along MCL, medial joint line but now also tender along lateral joint line.      Instability: No instability testing today.  Straight leg raise: Intact.  Motor: Grossly intact Q/HS/TA/GS/EHL/P  Sensory: Grossly intact DP/SP/S/S/T nerve distributions.       Imaging/Labs/EMG Reviewed:  Ordered left knee plain films.  Imaging read/interpreted by Dr. Concepcion.    Imaging Results (Last 24 Hours)     Procedure Component Value Units Date/Time    XR Knee 4+ View Left [[id number]] Resulted: 08/26/22 1054     Updated: 08/26/22 1056    Narrative:      Indication: Left knee pain    Comparison: Todays xrays were compared to previous xrays from 6/17/2022      Impression:           Left Knee: Radiographs demonstrate deformity at the lateral plateau   concerning for possible tibial plateau fracture with articular depression   of the lateral plateau.            Assessment:  1. Left knee pain, unspecified chronicity    2. Knee injury, left, initial encounter    3. Accidental fall, initial encounter    4. Sprain of medial collateral ligament of left knee, subsequent encounter        Plan:  Acute on chronic left knee pain due to knee injury/fall (8/24/2022)--concerning for lateral tibial plateau fracture with articular depression in setting of history of MCL sprain.  Patient placed in a TROM brace, locked in extension but may unlock when sitting.  TROM brace placed immediately to reduce further injury to her knee secondary to the suspected lateral plateau fracture with depression.  CT scan ordered--STAT.  Hoping to get done this weekend or Monday so patient can follow back up on Tuesday to discuss results and treatment options.  Crutches or walker to assist with transfers.  Minimize weightbearing to left lower extremity.  Recommend OTC pain medication as needed.  Follow up next week after CT scan completed.  Questions and concerns answered.      Mihaela Conteh PA-C  08/26/22  11:50  EDT      Dictated Utilizing Dragon Dictation.

## 2022-08-30 ENCOUNTER — HOSPITAL ENCOUNTER (OUTPATIENT)
Dept: CT IMAGING | Facility: HOSPITAL | Age: 62
Discharge: HOME | End: 2022-08-30
Admitting: PHYSICIAN ASSISTANT

## 2022-08-30 DIAGNOSIS — Z02.6 ENCOUNTER RELATED TO WORKER'S COMPENSATION CLAIM: Primary | ICD-10-CM

## 2022-08-30 DIAGNOSIS — S89.92XA KNEE INJURY, LEFT, INITIAL ENCOUNTER: ICD-10-CM
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
## 2022-08-30 DIAGNOSIS — M25.562 LEFT KNEE PAIN, UNSPECIFIED CHRONICITY: ICD-10-CM

## 2022-08-30 DIAGNOSIS — S82.142A CLOSED FRACTURE OF LEFT TIBIAL PLATEAU, INITIAL ENCOUNTER: ICD-10-CM

## 2022-08-30 PROCEDURE — 73700 CT LOWER EXTREMITY W/O DYE: CPT

## 2022-08-31 ENCOUNTER — TELEPHONE (OUTPATIENT)
Dept: ORTHOPEDIC SURGERY | Facility: CLINIC | Age: 62
End: 2022-08-31

## 2022-08-31 NOTE — TELEPHONE ENCOUNTER
Caller: Trina Garcia    Relationship: Self    Best call back number: [phone]    What form or medical record are you requesting: ALL PROG NOTES    Who is requesting this form or medical record from you: PATIENT, WANTS SENT TO EMPLOYER FOR WORK COMP    How would you like to receive the form or medical records (pick-up, mail, fax):FAX  If fax, what is the fax number: [phone]  [facility]  ATTN: ELISHA BOURNE  PH: [phone]  Timeframe paperwork needed: ASAP    Additional notes: PATIENT UNSURE OF FAX NUMBER, PLEASE CALL AND CONFIRM BEFORE SENDING

## 2025-02-11 NOTE — PROGRESS NOTES
"  Physical Therapy Initial Evaluation and Plan of Care    Patient: Trina Garcia   : 1960  Diagnosis/ICD-10 Code:  Acute pain of left knee [M25.562]  Referring practitioner: Fabien Valentin MD  Date of Initial Visit: 3/28/2022  Today's Date: 3/28/2022  Patient seen for 1 session         Visit Diagnoses:    ICD-10-CM ICD-9-CM   1. Acute pain of left knee  M25.562 719.46         Subjective Questionnaire: LEFS: 40/80      Subjective Evaluation    History of Present Illness  Date of surgery: 3/21/2022  Mechanism of injury: Injured her L leg while leaving work on 3/21/22. Was walking on level ground, took a \"bad step\" and felt sudden pn in her L leg along her hamstring to her buttock. Pn has improved somewhat from onset-now more localized to the outside of her knee. Has noticed mild swelling in the lateral aspect of her knee. Knee often feels unstable and feels as if it could lock up when she is walking.  Associated w/ clunking sensation, like something is \"out of joint\" and is popping back into place. Has limited tolerance to any prolonged position. Feels comfortable walking short flat distances indoor w/o her cane, otherwise keeps it w/ her for security, concern knee will give way.  Currently taking ibuprofen for pn mgmt. While on medication, pn fairly well controlled.     Subjective comment: L leg pn  Patient Occupation:  at [employer]; also works in [employer]  Current pain ratin  At best pain ratin  At worst pain ratin  Quality: discomfort  Relieving factors: change in position  Aggravating factors: prolonged positioning, ambulation and movement    Diagnostic Tests  No diagnostic tests performed    Patient Goals  Patient goals for therapy: decreased pain, decreased edema, increased motion, increased strength, independence with ADLs/IADLs, return to sport/leisure activities and return to work               Objective          Observations     Additional Knee " Observation Details  Localized edema present over distal aspect of VL    Tenderness     Additional Tenderness Details  Mild TTP medial/lateral hamstring tendons  (-) joint line TTP    Active Range of Motion   Left Knee   Flexion: 126 degrees with pain  Extension: 0 degrees     Right Knee   Flexion: 133 degrees   Extension: 0 degrees     Additional Active Range of Motion Details  Palpable clicking lateral joint line w/ active knee mobility    Patellar Mobility   Left Knee Patellar tendons within functional limits include the medial, lateral, superior and inferior.     Additional Patellar Mobility Details  No pn w/ patellar mobilization    Strength/Myotome Testing     Left Knee   Flexion: 4+  Extension: 5  Quadriceps contraction: good    Additional Strength Details  Mild inc pn w/ resisted knee flxn    Tests     Left Knee   Positive lateral Annika.   Negative anterior drawer, medial Annika, posterior drawer, valgus stress test at 0 degrees, valgus stress test at 30 degrees, varus stress test at 0 degrees and varus stress test at 30 degrees.     Ambulation     Observational Gait   Gait: antalgic   Decreased walking speed and left stance time.       See Exercise, Manual, and Modality Logs for complete treatment.       Assessment & Plan     Assessment  Impairments: abnormal gait, abnormal or restricted ROM, activity intolerance, impaired physical strength, lacks appropriate home exercise program, pain with function, safety issue and weight-bearing intolerance  Functional Limitations: walking, uncomfortable because of pain, sitting, standing and stooping  Assessment details: Pt is a 61 YOF who presents to PT w/ complaint of acute L knee/thigh pn after stepping awkwardly leaving work on 3/21/22. Findings consistent w/ lateral meniscus pathology, hamstring strain. Pt demonstrates painful L knee mobility, primarily into flxn which is mildly limited and elicits clicking along the lateral joint line. There is a focal area  of edema along the distal aspect of the VL. She denies TTP. Pn/clicking reproduced w/ lateral Annika test, [place]ss[place]. Ligamentous testing (-). Pt's pn and deficits limit tolerance to prolonged sitting/standing/walking, donning socks/shoes, navigating unlevel terrain. Pt would benefit from skilled PT services to address deficits, decrease pn, and maximize function.  Prognosis: good    Goals  Plan Goals: STG 3 wks  1) Pt to be compliant w/ initial HEP for ROM, strength and symptom mgmt.  2) Pt to report decreased episodes of instability w/ ambulation.  3) Pt to improve L knee ROM to 132 deg flxn.  4) Pt to improve L hamstring strength to 5/5 w/o inc pn.  5) Pt to improve LEFS score to 46/80 or better to reflect improved pn and function.    LTG 6 wks  1) Pt to be independent w/ long term HEP and self mgmt.  2) Pt to report ability to ambulate independently over various terrains w/o pn or perceived instability.  3) Pt to improve LEFS score to 52/80 or better to reflect improved pn and function.      Plan  Therapy options: will be seen for skilled therapy services  Planned modality interventions: cryotherapy, TENS, thermotherapy (hydrocollator packs) and ultrasound  Planned therapy interventions: balance/weight-bearing training, flexibility, functional ROM exercises, gait training, home exercise program, joint mobilization, manual therapy, neuromuscular re-education, soft tissue mobilization, strengthening, stretching, therapeutic activities and transfer training  Frequency: 2x week  Duration in visits: 15  Treatment plan discussed with: patient  Plan details: TE/TA/NMR/MT to address noted deficits; modalities as indicated for pn control    Pt will be out of town x3-4 weeks after this week        History # of Personal Factors and/or Comorbidities: LOW (0)  Examination of Body System(s): # of elements: LOW (1-2)  Clinical Presentation: EVOLVING  Clinical Decision Making: LOW       Timed:         Manual Therapy:    0      mins  05858;     Therapeutic Exercise:    10     mins  31800;     Neuromuscular Liset:    8    mins  31567;    Therapeutic Activity:     0     mins  76252;     Gait Trainin     mins  39986;     Ultrasound:     0     mins  44822;    Ionto                               0    mins   44049  Self Care                       0     mins   12928  Canalith Repos    0     mins 88733      Un-Timed:  Electrical Stimulation:    0     mins  81858 ( );  Dry Needling     0     mins self-pay  Traction     0     mins 32170  Low Eval     30     Mins  86018  Mod Eval     0     Mins  02433  High Eval                       0     Mins  18162        Timed Treatment:   18   mins   Total Treatment:     40   mins          PT: Deanne Sanchez PT     [place] License: #[id number]    Electronically signed by Deanne Sanchez PT, 22, 2:11 PM EDT    Certification Period: 3/28/2022 thru 2022  I certify that the therapy services are furnished while this patient is under my care.  The services outlined above are required by this patient, and will be reviewed every 90 days.         Physician Signature:__________________________________________________    PHYSICIAN: Fabien Valentin MD      DATE:     Please sign and return via fax to [phone]. Thank you, [facility].   Patient requires assistance with functional mobility. Based on today's evaluation, PT recommends D/C to home with assistance vs rehabilitation facility (dependent on functional outcomes) when medically appropriate. PT recommends a rolling walker for D/C to home.